# Patient Record
Sex: FEMALE | Race: WHITE | NOT HISPANIC OR LATINO | Employment: STUDENT | URBAN - METROPOLITAN AREA
[De-identification: names, ages, dates, MRNs, and addresses within clinical notes are randomized per-mention and may not be internally consistent; named-entity substitution may affect disease eponyms.]

---

## 2017-03-31 ENCOUNTER — TRANSCRIBE ORDERS (OUTPATIENT)
Dept: ADMINISTRATIVE | Facility: HOSPITAL | Age: 22
End: 2017-03-31

## 2017-03-31 DIAGNOSIS — R13.10 DYSPHAGIA, UNSPECIFIED TYPE: Primary | ICD-10-CM

## 2017-04-07 ENCOUNTER — HOSPITAL ENCOUNTER (OUTPATIENT)
Dept: ULTRASOUND IMAGING | Facility: HOSPITAL | Age: 22
Discharge: HOME | End: 2017-04-07
Admitting: INTERNAL MEDICINE

## 2017-04-07 DIAGNOSIS — R13.10 DYSPHAGIA, UNSPECIFIED TYPE: ICD-10-CM

## 2017-04-07 PROCEDURE — 76536 US EXAM OF HEAD AND NECK: CPT

## 2018-05-10 ENCOUNTER — TRANSCRIBE ORDERS (OUTPATIENT)
Dept: ADMINISTRATIVE | Facility: HOSPITAL | Age: 23
End: 2018-05-10

## 2018-05-10 DIAGNOSIS — R13.19 OTHER DYSPHAGIA: Primary | ICD-10-CM

## 2018-05-16 ENCOUNTER — APPOINTMENT (OUTPATIENT)
Dept: ULTRASOUND IMAGING | Facility: HOSPITAL | Age: 23
End: 2018-05-16

## 2022-06-30 ENCOUNTER — OFFICE VISIT (OUTPATIENT)
Dept: FAMILY MEDICINE CLINIC | Facility: CLINIC | Age: 27
End: 2022-06-30

## 2022-06-30 VITALS
WEIGHT: 230 LBS | DIASTOLIC BLOOD PRESSURE: 84 MMHG | SYSTOLIC BLOOD PRESSURE: 124 MMHG | BODY MASS INDEX: 45.16 KG/M2 | HEART RATE: 86 BPM | HEIGHT: 60 IN | TEMPERATURE: 98.6 F | OXYGEN SATURATION: 98 %

## 2022-06-30 DIAGNOSIS — L98.9 DISORDER OF SKIN: ICD-10-CM

## 2022-06-30 DIAGNOSIS — R74.8 ELEVATED ALKALINE PHOSPHATASE LEVEL: Primary | ICD-10-CM

## 2022-06-30 PROCEDURE — 99213 OFFICE O/P EST LOW 20 MIN: CPT | Performed by: PHYSICIAN ASSISTANT

## 2022-06-30 RX ORDER — OMEPRAZOLE 20 MG/1
20 CAPSULE, DELAYED RELEASE ORAL DAILY
COMMUNITY
End: 2022-12-19 | Stop reason: DRUGHIGH

## 2022-06-30 RX ORDER — LAMOTRIGINE 100 MG/1
200 TABLET ORAL DAILY
COMMUNITY
End: 2022-08-24 | Stop reason: SDUPTHER

## 2022-06-30 RX ORDER — AMLODIPINE BESYLATE 5 MG/1
5 TABLET ORAL DAILY
COMMUNITY
End: 2023-02-15 | Stop reason: DRUGHIGH

## 2022-06-30 NOTE — PROGRESS NOTES
"Chief Complaint  Rash (Bilateral hands; itchy. Stopped taking Latuda 60 mg, possible reaction.)    Subjective          Fabby Carter presents to [facility]  Patient in today for f/up on recurrent rash to tops of hands over past few months. She thought may be associated with her Latuda as noticed after she increased the daily dosage. She stopped taking the Latuda over a week ago and states her rash has improved and is now pretty much gone. She denies any other skin irritants or new skin products. Denies pain to area- but has been itchy when rash is there. States also had one small spot to base of right great toe but it has gone away as well.  Also while here she needs to recheck her alk phosphatase that showed mild elevation with last labs. Denies any abdominal pain.     Rash  This is a recurrent problem. The affected locations include the right hand and left hand. The rash is characterized by itchiness. It is unknown if there was an exposure to a precipitant. Pertinent negatives include no diarrhea, fatigue, shortness of breath or vomiting.       Objective   Vital Signs:   /84   Pulse 86   Temp 98.6 °F (37 °C)   Ht 152.4 cm (60\")   Wt 104 kg (230 lb)   SpO2 98%   BMI 44.92 kg/m²     Body mass index is 44.92 kg/m².    Review of Systems   Constitutional: Negative for fatigue.   Respiratory: Negative for shortness of breath.    Cardiovascular: Negative for chest pain.   Gastrointestinal: Negative for abdominal pain, diarrhea, nausea and vomiting.   Skin: Positive for rash.       Past History:  Medical History: has a past medical history of Anxiety, Hypertension, Neurotic depression, and Obesity.   Surgical History: has no past surgical history on file.   Family History: family history includes ADD / ADHD in her mother; Alcohol abuse in an other family member; Allergies in her father and mother; Cancer in an other family member; Depression in her mother; Diabetes in her " father and another family member; Mental illness in her father, mother, and another family member; Obesity in her father, mother, and another family member.   Social History: reports that she has never smoked. She has never used smokeless tobacco.      Current Outpatient Medications:   •  amLODIPine (NORVASC) 5 MG tablet, Take 5 mg by mouth Daily., Disp: , Rfl:   •  lamoTRIgine (LaMICtal) 100 MG tablet, Take 200 mg by mouth Daily., Disp: , Rfl:   •  omeprazole (priLOSEC) 20 MG capsule, Take 20 mg by mouth Daily., Disp: , Rfl:   Allergies: Patient has no known allergies.    Physical Exam  Constitutional:       Appearance: Normal appearance.   Cardiovascular:      Rate and Rhythm: Normal rate and regular rhythm.      Heart sounds: Normal heart sounds.   Pulmonary:      Effort: Pulmonary effort is normal.   Skin:     Comments: Normal skin exam today- no rash/redness noted to hands.    Neurological:      Mental Status: She is alert.             Assessment and Plan   Diagnoses and all orders for this visit:    1. Elevated alkaline phosphatase level (Primary)  -     Comprehensive Metabolic Panel; Future  -     Comprehensive Metabolic Panel  Will recheck cmp today.   2. Disorder of skin  Rash has gone away at this time. Picture she has brought in appears as dermatitis or possible dyshidrotic eczema.  Unsure if related to her medication but she has discontinued and will be f/up with her psychiatrist for further med mgt. If returns, can try topical hydrocortisone cream sparingly and aquaphor to help keep moisturized. If not improving would recommend derm eval.           Follow Up   Return if symptoms worsen or fail to improve.  Patient was given instructions and counseling regarding her condition or for health maintenance advice. Please see specific information pulled into the AVS if appropriate.     Leisa Brito PA-C

## 2022-07-01 DIAGNOSIS — E87.5 HYPERKALEMIA: Primary | ICD-10-CM

## 2022-07-01 LAB
ALBUMIN SERPL-MCNC: 5.1 G/DL (ref 3.9–5)
ALBUMIN/GLOB SERPL: 1.9 {RATIO} (ref 1.2–2.2)
ALP SERPL-CCNC: 176 IU/L (ref 44–121)
ALT SERPL-CCNC: 14 IU/L (ref 0–32)
AST SERPL-CCNC: 17 IU/L (ref 0–40)
BILIRUB SERPL-MCNC: 0.3 MG/DL (ref 0–1.2)
BUN SERPL-MCNC: 9 MG/DL (ref 6–20)
BUN/CREAT SERPL: 12 (ref 9–23)
CALCIUM SERPL-MCNC: 10.1 MG/DL (ref 8.7–10.2)
CHLORIDE SERPL-SCNC: 98 MMOL/L (ref 96–106)
CO2 SERPL-SCNC: 25 MMOL/L (ref 20–29)
CREAT SERPL-MCNC: 0.77 MG/DL (ref 0.57–1)
EGFRCR SERPLBLD CKD-EPI 2021: 109 ML/MIN/1.73
GLOBULIN SER CALC-MCNC: 2.7 G/DL (ref 1.5–4.5)
GLUCOSE SERPL-MCNC: 87 MG/DL (ref 65–99)
POTASSIUM SERPL-SCNC: 5.4 MMOL/L (ref 3.5–5.2)
PROT SERPL-MCNC: 7.8 G/DL (ref 6–8.5)
SODIUM SERPL-SCNC: 139 MMOL/L (ref 134–144)

## 2022-07-06 DIAGNOSIS — R74.8 ELEVATED SERUM ALKALINE PHOSPHATASE LEVEL: Primary | ICD-10-CM

## 2022-07-12 ENCOUNTER — LAB (OUTPATIENT)
Dept: FAMILY MEDICINE CLINIC | Facility: CLINIC | Age: 27
End: 2022-07-12

## 2022-07-12 DIAGNOSIS — E87.5 HYPERKALEMIA: ICD-10-CM

## 2022-07-12 PROCEDURE — 36415 COLL VENOUS BLD VENIPUNCTURE: CPT | Performed by: PHYSICIAN ASSISTANT

## 2022-07-13 LAB — POTASSIUM SERPL-SCNC: 4.8 MMOL/L (ref 3.5–5.2)

## 2022-10-01 ENCOUNTER — TELEPHONE (OUTPATIENT)
Dept: FAMILY MEDICINE CLINIC | Facility: CLINIC | Age: 27
End: 2022-10-01

## 2022-10-01 NOTE — TELEPHONE ENCOUNTER
Pt was calling to check on the status of her new GI referral. She states that she sent a message in [facility] [employer] and has not heard anything yet.

## 2022-10-03 DIAGNOSIS — R74.8 ELEVATED ALKALINE PHOSPHATASE LEVEL: Primary | ICD-10-CM

## 2022-10-03 NOTE — TELEPHONE ENCOUNTER
PATIENT INFORMED OF MESSAGE BUT PATIENT STATES SHE ARRIVED LATE TO THE APPOINTMENT WITH JESSICA ARIAS ON 08/24/2022 AND THEY WOULD NOT SEE HER. THE PATIENT REPORTS SHE WOULD LIKE AN ALTERNATE REFERRAL REQUEST TO [facility] at [address] [place] [address] PHONE NUMBER [phone]      PLEASE CALL THE PATIENT AND ADVISE [phone]

## 2022-10-03 NOTE — TELEPHONE ENCOUNTER
HUB TO READ    Left VM to return call. The scheduling team tried to reach out three times regarding referral and appt. Patient was scheduled 08/24/22 with Bella Vieira and was a no show. Please contact [phone] to reschedule that appt. Thanks!

## 2022-12-19 RX ORDER — OMEPRAZOLE 40 MG/1
40 CAPSULE, DELAYED RELEASE ORAL DAILY
Qty: 30 CAPSULE | Refills: 0 | Status: SHIPPED | OUTPATIENT
Start: 2022-12-19

## 2023-01-18 ENCOUNTER — TRANSCRIBE ORDERS (OUTPATIENT)
Dept: ADMINISTRATIVE | Facility: HOSPITAL | Age: 28
End: 2023-01-18
Payer: COMMERCIAL

## 2023-01-18 DIAGNOSIS — R74.8 ABNORMAL ALKALINE PHOSPHATASE TEST: Primary | ICD-10-CM

## 2023-02-09 ENCOUNTER — TELEPHONE (OUTPATIENT)
Dept: FAMILY MEDICINE CLINIC | Facility: CLINIC | Age: 28
End: 2023-02-09
Payer: COMMERCIAL

## 2023-02-13 NOTE — TELEPHONE ENCOUNTER
"LVM for pt to call back   HUB TO READ  \" Pt needs to be seen for refills If interested in weight loss can be seen by Crystal\" but does need an appt.   "
Pt has appt this week.  
She would need an appt to discuss bp and medication. Thanks.   
You can access the FollowMyHealth Patient Portal offered by [facility] by registering at the following website: [email or web address]. By joining [email or web address]’s FollowMyHealth portal, you will also be able to view your health information using other applications (apps) compatible with our system.

## 2023-02-15 ENCOUNTER — OFFICE VISIT (OUTPATIENT)
Dept: FAMILY MEDICINE CLINIC | Facility: CLINIC | Age: 28
End: 2023-02-15
Payer: COMMERCIAL

## 2023-02-15 VITALS
HEART RATE: 103 BPM | WEIGHT: 233 LBS | HEIGHT: 60 IN | BODY MASS INDEX: 45.75 KG/M2 | SYSTOLIC BLOOD PRESSURE: 130 MMHG | DIASTOLIC BLOOD PRESSURE: 80 MMHG | OXYGEN SATURATION: 98 %

## 2023-02-15 DIAGNOSIS — R74.8 ELEVATED ALKALINE PHOSPHATASE LEVEL: ICD-10-CM

## 2023-02-15 DIAGNOSIS — Z13.220 LIPID SCREENING: ICD-10-CM

## 2023-02-15 DIAGNOSIS — I10 ESSENTIAL HYPERTENSION: Primary | ICD-10-CM

## 2023-02-15 DIAGNOSIS — Z13.1 DIABETES MELLITUS SCREENING: ICD-10-CM

## 2023-02-15 PROCEDURE — 99214 OFFICE O/P EST MOD 30 MIN: CPT | Performed by: PHYSICIAN ASSISTANT

## 2023-02-15 PROCEDURE — 36415 COLL VENOUS BLD VENIPUNCTURE: CPT | Performed by: PHYSICIAN ASSISTANT

## 2023-02-15 RX ORDER — AMLODIPINE BESYLATE 10 MG/1
10 TABLET ORAL DAILY
Qty: 90 TABLET | Refills: 1 | Status: SHIPPED | OUTPATIENT
Start: 2023-02-15

## 2023-02-15 NOTE — PROGRESS NOTES
"Chief Complaint  Hypertension (Last week noticed BP was high been doubling dosage of amlodipine and has improved)    Subjective          Fabby Tasia Carter presents to [facility]  History of Present Illness  Patient in today for medication recheck and refill.  She states had noticed last week her blood pressure was staying elevated so she increased the dosage of amlodipine to 10 mg/day.  She states since that time her blood pressure has improved.  She denies any chest pain or shortness of breath.  She is here today to get her labs checked as well.  She did have follow-up with GI and had a normal EGD.  She has had mild elevation to her alk phosphatase levels and they had recommended a liver ultrasound but too expensive at this time.  They did do an isoenzyme test that was normal for bone liver and intestinal isoenzymes.  She states is trying to eat healthier and work on exercise to try and help lose weight. She states has been diagnosed with sleep apnea, but is still waiting to see if can get cpap covered through insurance.   Hypertension  This is a chronic problem. Pertinent negatives include no chest pain, peripheral edema or shortness of breath. Current antihypertension treatment includes calcium channel blockers.       Objective   Vital Signs:   /80 (BP Location: Left arm, Patient Position: Sitting, Cuff Size: Large Adult)   Pulse 103   Ht 152.4 cm (60\")   Wt 106 kg (233 lb)   SpO2 98%   BMI 45.50 kg/m²     Body mass index is 45.5 kg/m².    Review of Systems   Respiratory: Negative for shortness of breath.    Cardiovascular: Negative for chest pain.       Past History:  Medical History: has a past medical history of Anxiety, Hypertension, Neurotic depression, and Obesity.   Surgical History: has no past surgical history on file.   Family History: family history includes ADD / ADHD in her mother; Alcohol abuse in an other family member; Allergies in her father and " mother; Cancer in an other family member; Depression in her mother; Diabetes in her father and another family member; Mental illness in her father, mother, and another family member; Obesity in her father, mother, and another family member.   Social History: reports that she has never smoked. She has never used smokeless tobacco. She reports that she does not currently use alcohol. She reports that she does not use drugs.      Current Outpatient Medications:   •  omeprazole (priLOSEC) 40 MG capsule, Take 1 capsule by mouth Daily., Disp: 30 capsule, Rfl: 0  •  amLODIPine (NORVASC) 10 MG tablet, Take 1 tablet by mouth Daily., Disp: 90 tablet, Rfl: 1  Allergies: Patient has no known allergies.    Physical Exam        Assessment and Plan   Diagnoses and all orders for this visit:    1. Essential hypertension (Primary)  -     CBC & Differential; Future  -     Comprehensive Metabolic Panel; Future  -     TSH; Future  Will continue amlodipine 10 mg and continue to monitor bp. Encouraged healthy diet and exercise.  RTC prior to recheck as needed.   2. Lipid screening  -     Lipid Panel; Future  Will check lipid panel today.   3. Diabetes mellitus screening  -     Hemoglobin A1c; Future  4. Elevated alkaling phosphatase level  Patient has declined liver ultrasound at this time due to cost and prefers to monitor with labs and then will f/up with GI as directed.   Other orders  -     amLODIPine (NORVASC) 10 MG tablet; Take 1 tablet by mouth Daily.  Dispense: 90 tablet; Refill: 1            Follow Up   No follow-ups on file.  Patient was given instructions and counseling regarding her condition or for health maintenance advice. Please see specific information pulled into the AVS if appropriate.     Leisa Brito PA-C

## 2023-02-16 ENCOUNTER — TELEPHONE (OUTPATIENT)
Dept: FAMILY MEDICINE CLINIC | Facility: CLINIC | Age: 28
End: 2023-02-16

## 2023-02-16 DIAGNOSIS — D72.829 LEUKOCYTOSIS, UNSPECIFIED TYPE: Primary | ICD-10-CM

## 2023-02-16 LAB
ALBUMIN SERPL-MCNC: 4.7 G/DL (ref 3.9–5)
ALBUMIN/GLOB SERPL: 1.8 {RATIO} (ref 1.2–2.2)
ALP SERPL-CCNC: 158 IU/L (ref 44–121)
ALT SERPL-CCNC: 29 IU/L (ref 0–32)
AST SERPL-CCNC: 23 IU/L (ref 0–40)
BASOPHILS # BLD AUTO: 0.2 X10E3/UL (ref 0–0.2)
BASOPHILS NFR BLD AUTO: 1 %
BILIRUB SERPL-MCNC: 0.4 MG/DL (ref 0–1.2)
BUN SERPL-MCNC: 13 MG/DL (ref 6–20)
BUN/CREAT SERPL: 18 (ref 9–23)
CALCIUM SERPL-MCNC: 9.7 MG/DL (ref 8.7–10.2)
CHLORIDE SERPL-SCNC: 102 MMOL/L (ref 96–106)
CHOLEST SERPL-MCNC: 155 MG/DL (ref 100–199)
CO2 SERPL-SCNC: 23 MMOL/L (ref 20–29)
CREAT SERPL-MCNC: 0.72 MG/DL (ref 0.57–1)
EGFRCR SERPLBLD CKD-EPI 2021: 117 ML/MIN/1.73
EOSINOPHIL # BLD AUTO: 0.6 X10E3/UL (ref 0–0.4)
EOSINOPHIL NFR BLD AUTO: 4 %
ERYTHROCYTE [DISTWIDTH] IN BLOOD BY AUTOMATED COUNT: 12.9 % (ref 11.7–15.4)
GLOBULIN SER CALC-MCNC: 2.6 G/DL (ref 1.5–4.5)
GLUCOSE SERPL-MCNC: 105 MG/DL (ref 70–99)
HBA1C MFR BLD: 5.6 % (ref 4.8–5.6)
HCT VFR BLD AUTO: 42.4 % (ref 34–46.6)
HDLC SERPL-MCNC: 35 MG/DL
HGB BLD-MCNC: 14.4 G/DL (ref 11.1–15.9)
IMM GRANULOCYTES # BLD AUTO: 0 X10E3/UL (ref 0–0.1)
IMM GRANULOCYTES NFR BLD AUTO: 0 %
LDLC SERPL CALC-MCNC: 103 MG/DL (ref 0–99)
LYMPHOCYTES # BLD AUTO: 3.3 X10E3/UL (ref 0.7–3.1)
LYMPHOCYTES NFR BLD AUTO: 21 %
MCH RBC QN AUTO: 29.4 PG (ref 26.6–33)
MCHC RBC AUTO-ENTMCNC: 34 G/DL (ref 31.5–35.7)
MCV RBC AUTO: 87 FL (ref 79–97)
MONOCYTES # BLD AUTO: 1.1 X10E3/UL (ref 0.1–0.9)
MONOCYTES NFR BLD AUTO: 7 %
NEUTROPHILS # BLD AUTO: 10.4 X10E3/UL (ref 1.4–7)
NEUTROPHILS NFR BLD AUTO: 67 %
PLATELET # BLD AUTO: 397 X10E3/UL (ref 150–450)
POTASSIUM SERPL-SCNC: 4.6 MMOL/L (ref 3.5–5.2)
PROT SERPL-MCNC: 7.3 G/DL (ref 6–8.5)
RBC # BLD AUTO: 4.9 X10E6/UL (ref 3.77–5.28)
SODIUM SERPL-SCNC: 139 MMOL/L (ref 134–144)
TRIGL SERPL-MCNC: 92 MG/DL (ref 0–149)
TSH SERPL DL<=0.005 MIU/L-ACNC: 1.82 UIU/ML (ref 0.45–4.5)
VLDLC SERPL CALC-MCNC: 17 MG/DL (ref 5–40)
WBC # BLD AUTO: 15.6 X10E3/UL (ref 3.4–10.8)

## 2023-02-16 NOTE — TELEPHONE ENCOUNTER
Contacted pt and she states she is feeling fine and would like to know if you can put in an order to repeat  wbc

## 2023-02-16 NOTE — TELEPHONE ENCOUNTER
Recommendation is to be seen for evaluation, however if she is not wanting to do so, the minimal would be to check cbc in one week. I put in order.  If any acute symptoms, needs to be seen. Thanks.

## 2023-02-16 NOTE — TELEPHONE ENCOUNTER
"HUB TO READ  \"Please let know her labs showed her wbc elevated at 15.6, nml < 10.8-- this can be up from acute infection- she didn't mention anything yesterday about it but would recommend to come in to r/o any acute infection issues-- if acutely ill this evening would recommend urgent care or ER evaluation; alk phosphatase at 158; TSH and 3 month sugar average in nml range; thanks\"  "

## 2023-02-16 NOTE — TELEPHONE ENCOUNTER
"Maria Teresa Hopper MA          1:05 PM  Note    HUB TO READ  \"Please let know her labs showed her wbc elevated at 15.6, nml < 10.8-- this can be up from acute infection- she didn't mention anything yesterday about it but would recommend to come in to r/o any acute infection issues-- if acutely ill this evening would recommend urgent care or ER evaluation; alk phosphatase at 158; TSH and 3 month sugar average in nml range; thanks\"        PATIENT INFORMED OF HUB TO READ, UNDERSTOOD,  IS REPEATING THE WBC COUNT AN OPTION, IF SO NEEDS AN ORDER PLEASE ADVISE NOT FEELING ILL AT ALL.  HAS A HX OF ELEVATED WBC COUNT, PLEASE ADVISE  "

## 2023-02-17 NOTE — TELEPHONE ENCOUNTER
Patient called back read message from Jackelin she said she is not having symptoms scheduled CBC recheck

## 2023-02-23 ENCOUNTER — LAB (OUTPATIENT)
Dept: FAMILY MEDICINE CLINIC | Facility: CLINIC | Age: 28
End: 2023-02-23
Payer: COMMERCIAL

## 2023-02-23 DIAGNOSIS — D72.829 LEUKOCYTOSIS, UNSPECIFIED TYPE: ICD-10-CM

## 2023-02-23 PROCEDURE — 36415 COLL VENOUS BLD VENIPUNCTURE: CPT | Performed by: PHYSICIAN ASSISTANT

## 2023-02-24 LAB
BASOPHILS # BLD AUTO: 0.1 X10E3/UL (ref 0–0.2)
BASOPHILS NFR BLD AUTO: 1 %
EOSINOPHIL # BLD AUTO: 0.6 X10E3/UL (ref 0–0.4)
EOSINOPHIL NFR BLD AUTO: 5 %
ERYTHROCYTE [DISTWIDTH] IN BLOOD BY AUTOMATED COUNT: 13 % (ref 11.7–15.4)
HCT VFR BLD AUTO: 41 % (ref 34–46.6)
HGB BLD-MCNC: 13.9 G/DL (ref 11.1–15.9)
IMM GRANULOCYTES # BLD AUTO: 0 X10E3/UL (ref 0–0.1)
IMM GRANULOCYTES NFR BLD AUTO: 0 %
LYMPHOCYTES # BLD AUTO: 2.9 X10E3/UL (ref 0.7–3.1)
LYMPHOCYTES NFR BLD AUTO: 24 %
MCH RBC QN AUTO: 29.9 PG (ref 26.6–33)
MCHC RBC AUTO-ENTMCNC: 33.9 G/DL (ref 31.5–35.7)
MCV RBC AUTO: 88 FL (ref 79–97)
MONOCYTES # BLD AUTO: 0.9 X10E3/UL (ref 0.1–0.9)
MONOCYTES NFR BLD AUTO: 7 %
NEUTROPHILS # BLD AUTO: 7.7 X10E3/UL (ref 1.4–7)
NEUTROPHILS NFR BLD AUTO: 63 %
PLATELET # BLD AUTO: 392 X10E3/UL (ref 150–450)
RBC # BLD AUTO: 4.65 X10E6/UL (ref 3.77–5.28)
WBC # BLD AUTO: 12.2 X10E3/UL (ref 3.4–10.8)

## 2023-03-01 ENCOUNTER — OFFICE VISIT (OUTPATIENT)
Dept: FAMILY MEDICINE CLINIC | Facility: CLINIC | Age: 28
End: 2023-03-01
Payer: COMMERCIAL

## 2023-03-01 VITALS
HEIGHT: 60 IN | BODY MASS INDEX: 46.13 KG/M2 | OXYGEN SATURATION: 99 % | HEART RATE: 105 BPM | WEIGHT: 235 LBS | DIASTOLIC BLOOD PRESSURE: 78 MMHG | TEMPERATURE: 98.1 F | SYSTOLIC BLOOD PRESSURE: 140 MMHG

## 2023-03-01 DIAGNOSIS — M54.50 CHRONIC BILATERAL LOW BACK PAIN WITHOUT SCIATICA: ICD-10-CM

## 2023-03-01 DIAGNOSIS — D72.829 LEUKOCYTOSIS, UNSPECIFIED TYPE: ICD-10-CM

## 2023-03-01 DIAGNOSIS — M25.50 MULTIPLE JOINT PAIN: ICD-10-CM

## 2023-03-01 DIAGNOSIS — G89.29 CHRONIC BILATERAL LOW BACK PAIN WITHOUT SCIATICA: ICD-10-CM

## 2023-03-01 DIAGNOSIS — R31.9 HEMATURIA, UNSPECIFIED TYPE: Primary | ICD-10-CM

## 2023-03-01 LAB
BILIRUB BLD-MCNC: NEGATIVE MG/DL
CLARITY, POC: CLEAR
COLOR UR: YELLOW
GLUCOSE UR STRIP-MCNC: NEGATIVE MG/DL
KETONES UR QL: NEGATIVE
LEUKOCYTE EST, POC: NEGATIVE
NITRITE UR-MCNC: NEGATIVE MG/ML
PH UR: 6.5 [PH] (ref 5–8)
PROT UR STRIP-MCNC: NEGATIVE MG/DL
RBC # UR STRIP: ABNORMAL /UL
SP GR UR: 1.03 (ref 1–1.03)
UROBILINOGEN UR QL: NORMAL

## 2023-03-01 PROCEDURE — 81002 URINALYSIS NONAUTO W/O SCOPE: CPT | Performed by: PHYSICIAN ASSISTANT

## 2023-03-01 PROCEDURE — 99214 OFFICE O/P EST MOD 30 MIN: CPT | Performed by: PHYSICIAN ASSISTANT

## 2023-03-01 NOTE — PROGRESS NOTES
"Chief Complaint  follow up (Follow up on labs WBC high )    Subjective          Fabby Carter presents to [facility]  History of Present Illness  Patient today for further evaluation on mild elevation to her white blood cell count.  Initially had this drawn 2 weeks ago  and it was at 15.6 and then had redrawn last week and was down to 12.2.  He denies any symptoms of infection.  She denies any changes to skin.  She denies any fever.  Denies any nausea, vomiting, or diarrhea.  She denies any nasal congestion or sore throat or cough.  Denies any dysuria or increase of frequency of urination.  She states years ago her white blood cell count was elevated and no source was found and it went back down on its own. She states does have chronic multiple joint pains and would like to get a few additional labs for that checked with her next set of labs. She also has had chronic lower back pain for past 10-15 yrs. She states developed early and feels large breast size has caused a lot of her chronic back pain- currently wearing size 42H bra. If she stands for period of time, her lower back really starts to bother her. Denies any numbness into legs.       Objective   Vital Signs:   /78 (BP Location: Left arm, Patient Position: Sitting, Cuff Size: Large Adult)   Pulse 105   Ht 152.4 cm (60\")   Wt 107 kg (235 lb)   SpO2 99%   BMI 45.90 kg/m²     Body mass index is 45.9 kg/m².    Review of Systems   Constitutional: Negative for fatigue and fever.   HENT: Negative for congestion, ear pain and sore throat.    Respiratory: Negative for cough and shortness of breath.    Cardiovascular: Negative for chest pain.   Gastrointestinal: Negative for abdominal pain, diarrhea, nausea and vomiting.   Genitourinary: Negative for dysuria, frequency and hematuria.   Musculoskeletal: Positive for arthralgias and back pain.   Neurological: Negative for dizziness and headache.       Past " History:  Medical History: has a past medical history of Anxiety, Hypertension, Neurotic depression, and Obesity.   Surgical History: has no past surgical history on file.   Family History: family history includes ADD / ADHD in her mother; Alcohol abuse in an other family member; Allergies in her father and mother; Cancer in an other family member; Depression in her mother; Diabetes in her father and another family member; Mental illness in her father, mother, and another family member; Obesity in her father, mother, and another family member.   Social History: reports that she has never smoked. She has never used smokeless tobacco. She reports that she does not currently use alcohol. She reports that she does not use drugs.      Current Outpatient Medications:   •  amLODIPine (NORVASC) 10 MG tablet, Take 1 tablet by mouth Daily., Disp: 90 tablet, Rfl: 1  •  omeprazole (priLOSEC) 40 MG capsule, Take 1 capsule by mouth Daily., Disp: 30 capsule, Rfl: 0  Allergies: Patient has no known allergies.    Physical Exam  Constitutional:       Appearance: Normal appearance.   HENT:      Right Ear: Tympanic membrane normal.      Left Ear: Tympanic membrane normal.      Mouth/Throat:      Pharynx: Oropharynx is clear.   Eyes:      Conjunctiva/sclera: Conjunctivae normal.      Pupils: Pupils are equal, round, and reactive to light.   Cardiovascular:      Rate and Rhythm: Normal rate and regular rhythm.      Heart sounds: Normal heart sounds.   Pulmonary:      Effort: Pulmonary effort is normal.      Breath sounds: Normal breath sounds.   Abdominal:      Palpations: Abdomen is soft.      Tenderness: There is no abdominal tenderness.   Musculoskeletal:      Thoracic back: Normal.      Lumbar back: Tenderness present. Normal range of motion.      Comments: Walks with nml gait.    Skin:     Findings: No rash.   Neurological:      Mental Status: She is alert and oriented to person, place, and time.   Psychiatric:         Mood and  Affect: Mood normal.         Behavior: Behavior normal.             Assessment and Plan   Diagnoses and all orders for this visit:    1. Hematuria, unspecified type (Primary)  -     Urine Culture - Urine, Urine, Clean Catch; Future  -     POC Urinalysis Dipstick  -     Urine Culture - Urine, Urine, Clean Catch  Will send urine culture- her menstrual cycle ended a few days ago so could be related to that.   2. Leukocytosis, unspecified type  -     CBC & Differential; Future  Patient denies any symptoms of infection. Will recheck cbc in one week and if stays elevated will get in with hematology if needed.   3. Multiple joint pain  -     Sedimentation Rate; Future  -     ALICIA; Future  -     Rheumatoid Factor; Future  Will check ALICIA, RF and ESR with labs.   4. Chronic bilateral low back pain without sciatica  With chronic symptoms, discussed we can check xray of lower back and could try some physical therapy-- she states will consider and let us know if would like to proceed with that.           Follow Up   No follow-ups on file.  Patient was given instructions and counseling regarding her condition or for health maintenance advice. Please see specific information pulled into the AVS if appropriate.     Leisa Brito PA-C

## 2023-03-03 LAB
BACTERIA UR CULT: NORMAL
BACTERIA UR CULT: NORMAL

## 2023-03-06 DIAGNOSIS — R31.9 HEMATURIA, UNSPECIFIED TYPE: Primary | ICD-10-CM

## 2023-03-10 ENCOUNTER — LAB (OUTPATIENT)
Dept: FAMILY MEDICINE CLINIC | Facility: CLINIC | Age: 28
End: 2023-03-10
Payer: COMMERCIAL

## 2023-03-10 DIAGNOSIS — D72.829 LEUKOCYTOSIS, UNSPECIFIED TYPE: ICD-10-CM

## 2023-03-10 DIAGNOSIS — M25.50 MULTIPLE JOINT PAIN: ICD-10-CM

## 2023-03-10 PROCEDURE — 36415 COLL VENOUS BLD VENIPUNCTURE: CPT | Performed by: PHYSICIAN ASSISTANT

## 2023-03-11 LAB
ANA SER QL: NEGATIVE
BASOPHILS # BLD AUTO: 0.1 X10E3/UL (ref 0–0.2)
BASOPHILS NFR BLD AUTO: 1 %
EOSINOPHIL # BLD AUTO: 0.4 X10E3/UL (ref 0–0.4)
EOSINOPHIL NFR BLD AUTO: 3 %
ERYTHROCYTE [DISTWIDTH] IN BLOOD BY AUTOMATED COUNT: 12.9 % (ref 11.7–15.4)
ERYTHROCYTE [SEDIMENTATION RATE] IN BLOOD BY WESTERGREN METHOD: 6 MM/HR (ref 0–32)
HCT VFR BLD AUTO: 41.3 % (ref 34–46.6)
HGB BLD-MCNC: 14.2 G/DL (ref 11.1–15.9)
IMM GRANULOCYTES # BLD AUTO: 0 X10E3/UL (ref 0–0.1)
IMM GRANULOCYTES NFR BLD AUTO: 0 %
LYMPHOCYTES # BLD AUTO: 3 X10E3/UL (ref 0.7–3.1)
LYMPHOCYTES NFR BLD AUTO: 24 %
MCH RBC QN AUTO: 29.9 PG (ref 26.6–33)
MCHC RBC AUTO-ENTMCNC: 34.4 G/DL (ref 31.5–35.7)
MCV RBC AUTO: 87 FL (ref 79–97)
MONOCYTES # BLD AUTO: 0.8 X10E3/UL (ref 0.1–0.9)
MONOCYTES NFR BLD AUTO: 6 %
NEUTROPHILS # BLD AUTO: 8 X10E3/UL (ref 1.4–7)
NEUTROPHILS NFR BLD AUTO: 66 %
PLATELET # BLD AUTO: 403 X10E3/UL (ref 150–450)
RBC # BLD AUTO: 4.75 X10E6/UL (ref 3.77–5.28)
RHEUMATOID FACT SERPL-ACNC: <10 IU/ML
WBC # BLD AUTO: 12.3 X10E3/UL (ref 3.4–10.8)

## 2023-03-13 DIAGNOSIS — D72.829 LEUKOCYTOSIS, UNSPECIFIED TYPE: Primary | ICD-10-CM

## 2023-03-30 ENCOUNTER — TELEPHONE (OUTPATIENT)
Dept: FAMILY MEDICINE CLINIC | Facility: CLINIC | Age: 28
End: 2023-03-30
Payer: COMMERCIAL

## 2023-04-05 ENCOUNTER — OFFICE VISIT (OUTPATIENT)
Dept: FAMILY MEDICINE CLINIC | Facility: CLINIC | Age: 28
End: 2023-04-05
Payer: COMMERCIAL

## 2023-04-05 VITALS
WEIGHT: 230 LBS | HEIGHT: 60 IN | HEART RATE: 85 BPM | OXYGEN SATURATION: 98 % | BODY MASS INDEX: 45.16 KG/M2 | DIASTOLIC BLOOD PRESSURE: 80 MMHG | SYSTOLIC BLOOD PRESSURE: 134 MMHG

## 2023-04-05 DIAGNOSIS — R00.2 PALPITATIONS: Primary | ICD-10-CM

## 2023-04-05 DIAGNOSIS — R07.9 CHEST PAIN, UNSPECIFIED TYPE: ICD-10-CM

## 2023-04-05 PROCEDURE — 99213 OFFICE O/P EST LOW 20 MIN: CPT | Performed by: PHYSICIAN ASSISTANT

## 2023-04-05 PROCEDURE — 93000 ELECTROCARDIOGRAM COMPLETE: CPT | Performed by: PHYSICIAN ASSISTANT

## 2023-04-05 NOTE — PROGRESS NOTES
"Chief Complaint  Chest Pain (chest pain off and on for a year and heart fluttering for a month )    Subjective          Fabby Tasia Carter presents to [facility]  History of Present Illness  Patient in today for evaluation on intermittent chest pain for past year and heart fluttering sensation for past month. States occurs at rest and with exertion. Pulse rate has been >120 on occasion. Is utd on labs. Describes chest pain as pressure/squeezing sensation. States tends to get headaches after these episodes.   Chest Pain   This is a recurrent problem. Associated symptoms include palpitations. Pertinent negatives include no abdominal pain, back pain, dizziness, fever, nausea, shortness of breath or vomiting.       Objective   Vital Signs:   /80 (BP Location: Left arm, Patient Position: Sitting, Cuff Size: Large Adult)   Pulse 85   Ht 152.4 cm (60\")   Wt 104 kg (230 lb)   SpO2 98%   BMI 44.92 kg/m²     Body mass index is 44.92 kg/m².    Review of Systems   Constitutional: Negative for fever.   HENT: Negative for congestion and sore throat.    Respiratory: Negative for shortness of breath.    Cardiovascular: Positive for chest pain and palpitations.   Gastrointestinal: Negative for abdominal pain, diarrhea, nausea and vomiting.   Musculoskeletal: Negative for back pain.   Neurological: Positive for headache. Negative for dizziness.   Psychiatric/Behavioral: Negative for depressed mood. The patient is not nervous/anxious.        Past History:  Medical History: has a past medical history of Anxiety, Hypertension, Neurotic depression, and Obesity.   Surgical History: has no past surgical history on file.   Family History: family history includes ADD / ADHD in her mother; Alcohol abuse in an other family member; Allergies in her father and mother; Cancer in an other family member; Depression in her mother; Diabetes in her father and another family member; Mental illness in her " father, mother, and another family member; Obesity in her father, mother, and another family member.   Social History: reports that she has never smoked. She has never used smokeless tobacco. She reports that she does not currently use alcohol. She reports that she does not use drugs.      Current Outpatient Medications:   •  amLODIPine (NORVASC) 10 MG tablet, Take 1 tablet by mouth Daily., Disp: 90 tablet, Rfl: 1  •  omeprazole (priLOSEC) 40 MG capsule, Take 1 capsule by mouth Daily., Disp: 30 capsule, Rfl: 0  Allergies: Patient has no known allergies.    Physical Exam  Constitutional:       Appearance: Normal appearance.   HENT:      Right Ear: Tympanic membrane normal.      Left Ear: Tympanic membrane normal.      Mouth/Throat:      Pharynx: Oropharynx is clear.   Eyes:      Conjunctiva/sclera: Conjunctivae normal.      Pupils: Pupils are equal, round, and reactive to light.   Cardiovascular:      Rate and Rhythm: Normal rate and regular rhythm.      Heart sounds: Normal heart sounds.   Pulmonary:      Effort: Pulmonary effort is normal.      Breath sounds: Normal breath sounds.   Abdominal:      Palpations: Abdomen is soft.      Tenderness: There is no abdominal tenderness.   Neurological:      Mental Status: She is oriented to person, place, and time.   Psychiatric:         Mood and Affect: Mood normal.         Behavior: Behavior normal.        ECG 12 Lead    Date/Time: 4/5/2023 7:27 PM  Performed by: Leisa Brito PA-C  Authorized by: Leisa Brito PA-C   Previous ECG: no previous ECG available  Rhythm: sinus rhythm  BPM: 75    Clinical impression: normal ECG               Assessment and Plan   Diagnoses and all orders for this visit:    1. Palpitations (Primary)  EKG today was normal. .Has had recent labs that showed normal TSH and not anemic. With persistent symptoms, will set up with cardiology for consult. To ER prior for any acute /worsening symptoms if needed.   2. Chest pain, unspecified  type    Other orders  -     ECG 12 Lead            Follow Up   No follow-ups on file.  Patient was given instructions and counseling regarding her condition or for health maintenance advice. Please see specific information pulled into the AVS if appropriate.     Leisa Brito PA-C

## 2023-04-24 ENCOUNTER — TELEPHONE (OUTPATIENT)
Dept: ONCOLOGY | Facility: CLINIC | Age: 28
End: 2023-04-24

## 2023-04-24 NOTE — TELEPHONE ENCOUNTER
Caller: Fabby Carter    Relationship to patient: Self    Best call back number:[phone]    Chief complaint: DOES NOT HAVE THE CO PAY    Type of visit: NEW PT APPT    Requested date: CALL TO R/S    If rescheduling, when is the original appointment: 4/25/2023

## 2023-04-25 PROBLEM — D72.829 LEUCOCYTOSIS: Status: ACTIVE | Noted: 2023-04-25

## 2023-05-10 ENCOUNTER — OFFICE VISIT (OUTPATIENT)
Dept: FAMILY MEDICINE CLINIC | Facility: CLINIC | Age: 28
End: 2023-05-10
Payer: COMMERCIAL

## 2023-05-10 VITALS
BODY MASS INDEX: 45.75 KG/M2 | DIASTOLIC BLOOD PRESSURE: 80 MMHG | SYSTOLIC BLOOD PRESSURE: 124 MMHG | HEART RATE: 82 BPM | HEIGHT: 60 IN | WEIGHT: 233 LBS | OXYGEN SATURATION: 98 %

## 2023-05-10 DIAGNOSIS — R05.1 ACUTE COUGH: Primary | ICD-10-CM

## 2023-05-10 PROCEDURE — 99213 OFFICE O/P EST LOW 20 MIN: CPT | Performed by: PHYSICIAN ASSISTANT

## 2023-05-10 NOTE — PROGRESS NOTES
"Chief Complaint  Cough (Pt comes in today for cough x 1-2 months.)    Subjective          Fabby aTsia Carter presents to [facility]  History of Present Illness  Patient in today for evaluation persistent hacky cough for past several months. She denies any fever or nasal congestion associated. She states her reflux symptoms seem well controlled on the omeprazole daily. She tried daily allegra but did not seem to help. Denies wheezing. Denies vomiting or diarrhea.   Cough  This is a recurrent problem. The current episode started more than 1 month ago. The cough is non-productive. Pertinent negatives include no chest pain, ear pain, fever, headaches, heartburn, nasal congestion, postnasal drip, shortness of breath or wheezing.       Objective   Vital Signs:   /80 (BP Location: Left arm, Patient Position: Sitting, Cuff Size: Large Adult)   Pulse 82   Ht 152.4 cm (60\")   Wt 106 kg (233 lb)   SpO2 98%   BMI 45.50 kg/m²     Body mass index is 45.5 kg/m².    Review of Systems   Constitutional: Negative for fever.   HENT: Negative for ear pain and postnasal drip.    Respiratory: Positive for cough. Negative for shortness of breath and wheezing.    Cardiovascular: Negative for chest pain, palpitations and leg swelling.   Gastrointestinal: Negative for abdominal pain, diarrhea, nausea and vomiting.   Neurological: Negative for dizziness and headache.       Past History:  Medical History: has a past medical history of Anxiety, Hypertension, Neurotic depression, and Obesity.   Surgical History: has no past surgical history on file.   Family History: family history includes ADD / ADHD in her mother; Alcohol abuse in an other family member; Allergies in her father and mother; Cancer in an other family member; Depression in her mother; Diabetes in her father and another family member; Mental illness in her father, mother, and another family member; Obesity in her father, mother, and " another family member.   Social History: reports that she has never smoked. She has never used smokeless tobacco. She reports that she does not currently use alcohol. She reports that she does not use drugs.      Current Outpatient Medications:   •  amLODIPine (NORVASC) 10 MG tablet, Take 1 tablet by mouth Daily., Disp: 90 tablet, Rfl: 1  •  omeprazole (priLOSEC) 40 MG capsule, Take 1 capsule by mouth Daily., Disp: 30 capsule, Rfl: 0  Allergies: Patient has no known allergies.    Physical Exam  Constitutional:       Appearance: Normal appearance.   HENT:      Right Ear: Tympanic membrane normal.      Left Ear: Tympanic membrane normal.      Mouth/Throat:      Pharynx: Oropharynx is clear.   Eyes:      Conjunctiva/sclera: Conjunctivae normal.      Pupils: Pupils are equal, round, and reactive to light.   Cardiovascular:      Rate and Rhythm: Normal rate and regular rhythm.      Heart sounds: Normal heart sounds.   Pulmonary:      Effort: Pulmonary effort is normal.      Breath sounds: Normal breath sounds.   Abdominal:      Palpations: Abdomen is soft.      Tenderness: There is no abdominal tenderness.   Musculoskeletal:      Right lower leg: No edema.      Left lower leg: No edema.   Neurological:      Mental Status: She is oriented to person, place, and time.   Psychiatric:         Mood and Affect: Mood normal.         Behavior: Behavior normal.             Assessment and Plan   Diagnoses and all orders for this visit:    1. Acute cough (Primary)  -     XR Chest PA & Lateral (In Office)  Lungs sound clear today. Will check chest xray with persistent cough and to consider pulmonology referral . Continue treatment for reflux and allergies. RTC prn.           Follow Up   No follow-ups on file.  Patient was given instructions and counseling regarding her condition or for health maintenance advice. Please see specific information pulled into the AVS if appropriate.     Leisa Brito PA-C

## 2023-05-23 ENCOUNTER — CONSULT (OUTPATIENT)
Dept: ONCOLOGY | Facility: CLINIC | Age: 28
End: 2023-05-23
Payer: COMMERCIAL

## 2023-05-23 VITALS
OXYGEN SATURATION: 98 % | HEIGHT: 60 IN | BODY MASS INDEX: 46.53 KG/M2 | RESPIRATION RATE: 18 BRPM | WEIGHT: 237 LBS | SYSTOLIC BLOOD PRESSURE: 140 MMHG | DIASTOLIC BLOOD PRESSURE: 85 MMHG | HEART RATE: 87 BPM | TEMPERATURE: 98.6 F

## 2023-05-23 DIAGNOSIS — D72.828 OTHER ELEVATED WHITE BLOOD CELL (WBC) COUNT: Primary | ICD-10-CM

## 2023-05-23 PROCEDURE — 99204 OFFICE O/P NEW MOD 45 MIN: CPT | Performed by: INTERNAL MEDICINE

## 2023-05-23 RX ORDER — OMEPRAZOLE 40 MG/1
40 CAPSULE, DELAYED RELEASE ORAL DAILY
Qty: 90 CAPSULE | Refills: 1 | Status: SHIPPED | OUTPATIENT
Start: 2023-05-23

## 2023-05-23 NOTE — PROGRESS NOTES
CHIEF COMPLAINT: Leukocytosis    REASON FOR REFERRAL: Leukocytosis      RECORDS OBTAINED  Records of the patients history including those obtained from primary care and Dr. Meredith were reviewed and summarized in detail.    Oncology/Hematology History Overview Note   1.  Leukocytosis  2.  Arthritis with chronic low back pain  3.  Gastritis on EGD January 2023  4.  Hypertension  5.  Obstructive sleep apnea potentially with obesity with evidence of reflux and laryngeal edema on endoscopy with ENT   6.  Anxiety depression on Lamictal with erythema back of hands on higher doses of Lamictal    Hematology history timeline:  -3/3/2022 CBC unremarkable with white count 10,200 differential.  -3/25/2022 potential obstructive sleep apnea with obesity with evidence of reflux and laryngeal edema on endoscopy with ENT this day.  -6/30/2022 alkaline phosphatase 176  -12/15/2022 alkaline phosphatase 143 with normal percentages of intestinal, bone, and liver isoenzymes.  GGT normal.  Celiac panel negative.  Calprotectin normal.  Qualitative fecal fat normal.  Pancreatic elastase normal indicative of known pancreatic insufficiency.  -1/4/2023 EGD Han Meredith showed mild gastric erythema otherwise normal EGD placed on omeprazole twice a day with plan for right upper quadrant ultrasound to evaluate elevated alkaline phosphatase.  Pathology consistent with chronic gastritis with esophageal biopsy mild basal cell hyperplasia.  -1/13/2023 abdominal ultrasound patient canceled.  -2/15/2023 white count 15,600 with otherwise unremarkable CBC.  Alkaline phosphatase 158 glucose 105 otherwise unremarkable CBC.  -2/23/2023 white count 12,200 otherwise unremarkable CBC  -3/10/2023 white count 12,300 otherwise unremarkable CBC and differential.  Rheumatoid factor and ALICIA negative.  Sedimentation rate 6.    -5/23/2023 [facility] hematology consult: Patient has had chronic dyspeptic symptoms with evidence of laryngeal edema and some gastritis.  The  EGD that was performed by Dr. Meredith earlier this year was performed after she had been on 9 months of omeprazole after her ENT exam March 2022 showed laryngeal edema and hence some of the dyspeptic symptoms may have been mitigated.  However, due to financial constraints she has not been able to do the abdominal ultrasound ordered by Dr. Meredith nor has she gotten refills on her omeprazole so she has been only taking it about every other day for the past month.  She will get with Leisa Brito for refill on her omeprazole or at the very minimum will use over-the-counter Nexium and I explained about the reality of rebound hyperacidity on cessation of proton pump inhibition.  If her white count today is actually higher than it was in March that would suggest this.  I will check her peripheral smear but my suspicions for a chronic leukemia or malignant process especially given that she recounts having had a mild elevation of white counts periodically back into her early teens makes these more sinister culprits very unlikely particularly at her young age and with no lymphadenopathy on exam and no hepatosplenomegaly on exam.  I will check a blood count and peripheral smear with pathology to review and a blood count again in about 2 months on routine proton pump inhibition.  The fact that her sedimentation rate is normal does not rule out inflammation given that her white count elevation is minuscule and is likely just an indicator of low-level inflammation of which she has many potential culprits.  She has had a lot of stress both physically and fiscally which has made it hard for her to comply with all of her testing orders and with all of her medications due to financial constraints.  With regard to the periodic rare erythema on the dorsum of both hands, she has never seen dermatology but I asked her to get with Leisa Brito for dermatology appointment so that, if she ever has another event such as that, she can  urgently be seen for biopsy during the flare to look for leukocytoclastic vasculitis but there is no serologic evidence nor current clinical evidence of lupus or other autoimmune disorder that I can detect.     Leucocytosis       HISTORY OF PRESENT ILLNESS:  The patient is a 27 y.o.  female, referred for chronic leukocytosis that she says dates back even into her early teens and has waxed and waned over time.  Takes Lamictal for depression anxiety and on higher dose states that the dorsum of her hands turns red.  Has never seen dermatology during 1 of these episodes.  Denies fevers or chills.  She has had symptoms of reflux and signs of this on ENT and EGD examination.  More recently due to financial constraints and lack of refills she has been off the omeprazole and instead of taking it twice a day as prescribed by Dr. Meredith has only been taking it about once every other day for the last month.    REVIEW OF SYSTEMS:  Periodic chest pains with nocturnal reflux symptoms and erythema dorsum of hands periodically.  No ronak synovitis.  No fevers or chills or unexplained weight loss.    Past Medical History:   Diagnosis Date   • Anxiety    • Hypertension    • Neurotic depression    • Obesity      No past surgical history on file.    Current Outpatient Medications on File Prior to Visit   Medication Sig Dispense Refill   • amLODIPine (NORVASC) 10 MG tablet Take 1 tablet by mouth Daily. 90 tablet 1   • omeprazole (priLOSEC) 40 MG capsule Take 1 capsule by mouth Daily. 30 capsule 0     No current facility-administered medications on file prior to visit.       No Known Allergies    Social History     Socioeconomic History   • Marital status:    Tobacco Use   • Smoking status: Never   • Smokeless tobacco: Never   Vaping Use   • Vaping Use: Never used   Substance and Sexual Activity   • Alcohol use: Not Currently   • Drug use: Never   • Sexual activity: Yes     Partners: Female       Family History   Problem Relation  "Age of Onset   • Depression Mother    • ADD / ADHD Mother    • Obesity Mother    • Allergies Mother    • Mental illness Mother    • Diabetes Father    • Allergies Father    • Obesity Father    • Mental illness Father    • Mental illness Other    • Obesity Other    • Alcohol abuse Other    • Cancer Other    • Diabetes Other        PHYSICAL EXAM:  No ronak synovitis or rashes.  No cervical axillary or inguinal lymphadenopathy.  No splenomegaly.  No audible wheezes.  No respiratory distress.    /85   Pulse 87   Temp 98.6 °F (37 °C)   Resp 18   Ht 152.4 cm (60\")   Wt 108 kg (237 lb)   SpO2 98%   BMI 46.29 kg/m²     ECOG score: 0           ECOG: (0) Fully Active - Able to Carry On All Pre-disease Performance Without Restriction    Lab Results   Component Value Date    HGB 14.2 03/10/2023    HCT 41.3 03/10/2023    MCV 87 03/10/2023     03/10/2023    WBC 12.3 (H) 03/10/2023    NEUTROABS 8.0 (H) 03/10/2023    LYMPHSABS 3.0 03/10/2023    MONOSABS 0.8 03/10/2023    EOSABS 0.4 03/10/2023    BASOSABS 0.1 03/10/2023     Lab Results   Component Value Date    GLUCOSE 105 (H) 02/15/2023    BUN 13 02/15/2023    CREATININE 0.72 02/15/2023     02/15/2023    K 4.6 02/15/2023     02/15/2023    CO2 23 02/15/2023    CALCIUM 9.7 02/15/2023    ALBUMIN 4.7 02/15/2023    BILITOT 0.4 02/15/2023    ALKPHOS 158 (H) 02/15/2023    AST 23 02/15/2023    ALT 29 02/15/2023         Assessment & Plan     1.  Leukocytosis  2.  Arthritis with chronic low back pain  3.  Gastritis on EGD January 2023  4.  Hypertension  5.  Obstructive sleep apnea potentially with obesity with evidence of reflux and laryngeal edema on endoscopy with ENT   6.  Anxiety depression on Lamictal with erythema back of hands on higher doses of Lamictal    Hematology history timeline:  -3/3/2022 CBC unremarkable with white count 10,200 differential.  -3/25/2022 potential obstructive sleep apnea with obesity with evidence of reflux and laryngeal edema " on endoscopy with ENT this day.  -6/30/2022 alkaline phosphatase 176  -12/15/2022 alkaline phosphatase 143 with normal percentages of intestinal, bone, and liver isoenzymes.  GGT normal.  Celiac panel negative.  Calprotectin normal.  Qualitative fecal fat normal.  Pancreatic elastase normal indicative of known pancreatic insufficiency.  -1/4/2023 EGD Han Meredith showed mild gastric erythema otherwise normal EGD placed on omeprazole twice a day with plan for right upper quadrant ultrasound to evaluate elevated alkaline phosphatase.  Pathology consistent with chronic gastritis with esophageal biopsy mild basal cell hyperplasia.  -1/13/2023 abdominal ultrasound patient canceled.  -2/15/2023 white count 15,600 with otherwise unremarkable CBC.  Alkaline phosphatase 158 glucose 105 otherwise unremarkable CBC.  -2/23/2023 white count 12,200 otherwise unremarkable CBC  -3/10/2023 white count 12,300 otherwise unremarkable CBC and differential.  Rheumatoid factor and ALICIA negative.  Sedimentation rate 6.    -5/23/2023 [facility] hematology consult: Patient has had chronic dyspeptic symptoms with evidence of laryngeal edema and some gastritis.  The EGD that was performed by Dr. Meredith earlier this year was performed after she had been on 9 months of omeprazole after her ENT exam March 2022 showed laryngeal edema and hence some of the dyspeptic symptoms may have been mitigated.  However, due to financial constraints she has not been able to do the abdominal ultrasound ordered by Dr. Meredith nor has she gotten refills on her omeprazole so she has been only taking it about every other day for the past month.  She will get with Leisa Brito for refill on her omeprazole or at the very minimum will use over-the-counter Nexium and I explained about the reality of rebound hyperacidity on cessation of proton pump inhibition.  If her white count today is actually higher than it was in March that would suggest this.  I will check her  peripheral smear but my suspicions for a chronic leukemia or malignant process especially given that she recounts having had a mild elevation of white counts periodically back into her early teens makes these more sinister culprits very unlikely particularly at her young age and with no lymphadenopathy on exam and no hepatosplenomegaly on exam.  I will check a blood count and peripheral smear with pathology to review and a blood count again in about 2 months on routine proton pump inhibition.  The fact that her sedimentation rate is normal does not rule out inflammation given that her white count elevation is minuscule and is likely just an indicator of low-level inflammation of which she has many potential culprits.  She has had a lot of stress both physically and fiscally which has made it hard for her to comply with all of her testing orders and with all of her medications due to financial constraints.  With regard to the periodic rare erythema on the dorsum of both hands, she has never seen dermatology but I asked her to get with Leisa Brito for dermatology appointment so that, if she ever has another event such as that, she can urgently be seen for biopsy during the flare to look for leukocytoclastic vasculitis but there is no serologic evidence nor current clinical evidence of lupus or other autoimmune disorder that I can detect.    Total time of care today inclusive of time spent today prior to her arrival reviewing past medical records and cataloging data as outlined above and during visit interviewing her as to signs or symptoms of potential culprits of reactive leukocytosis and the difference between that and clonal proliferation disorders of myelocytes and lymphocytes which are much less likely in her setting and the work-up thereof and after visit instituting this plan took 45 minutes of patient care time throughout the day today.    Herberth Han MD    5/23/2023

## 2023-05-23 NOTE — LETTER
May 23, 2023     Leisa Brito PA-C  [address] [facility] [address]    Patient: Fabby Carter   YOB: 1995   Date of Visit: 5/23/2023       Dear Leisa Brito PA-C    Fabby Carter was in my office today. Below is a copy of my note.    If you have questions, please do not hesitate to call me. I look forward to following Fabby along with you.         Sincerely,        Herberth Han MD        CC: No Recipients    CHIEF COMPLAINT: Leukocytosis    REASON FOR REFERRAL: Leukocytosis      RECORDS OBTAINED  Records of the patients history including those obtained from primary care and Dr. Meredith were reviewed and summarized in detail.    Oncology/Hematology History Overview Note   1.  Leukocytosis  2.  Arthritis with chronic low back pain  3.  Gastritis on EGD January 2023  4.  Hypertension  5.  Obstructive sleep apnea potentially with obesity with evidence of reflux and laryngeal edema on endoscopy with ENT   6.  Anxiety depression on Lamictal with erythema back of hands on higher doses of Lamictal    Hematology history timeline:  -3/3/2022 CBC unremarkable with white count 10,200 differential.  -3/25/2022 potential obstructive sleep apnea with obesity with evidence of reflux and laryngeal edema on endoscopy with ENT this day.  -6/30/2022 alkaline phosphatase 176  -12/15/2022 alkaline phosphatase 143 with normal percentages of intestinal, bone, and liver isoenzymes.  GGT normal.  Celiac panel negative.  Calprotectin normal.  Qualitative fecal fat normal.  Pancreatic elastase normal indicative of known pancreatic insufficiency.  -1/4/2023 EGD Han Meredith showed mild gastric erythema otherwise normal EGD placed on omeprazole twice a day with plan for right upper quadrant ultrasound to evaluate elevated alkaline phosphatase.  Pathology consistent with chronic gastritis with esophageal biopsy mild basal cell hyperplasia.  -1/13/2023 abdominal ultrasound patient canceled.  -2/15/2023  white count 15,600 with otherwise unremarkable CBC.  Alkaline phosphatase 158 glucose 105 otherwise unremarkable CBC.  -2/23/2023 white count 12,200 otherwise unremarkable CBC  -3/10/2023 white count 12,300 otherwise unremarkable CBC and differential.  Rheumatoid factor and ALICIA negative.  Sedimentation rate 6.    -5/23/2023 [facility]ology consult: Patient has had chronic dyspeptic symptoms with evidence of laryngeal edema and some gastritis.  The EGD that was performed by Dr. Meredith earlier this year was performed after she had been on 9 months of omeprazole after her ENT exam March 2022 showed laryngeal edema and hence some of the dyspeptic symptoms may have been mitigated.  However, due to financial constraints she has not been able to do the abdominal ultrasound ordered by Dr. Meredith nor has she gotten refills on her omeprazole so she has been only taking it about every other day for the past month.  She will get with Leisa Brito for refill on her omeprazole or at the very minimum will use over-the-counter Nexium and I explained about the reality of rebound hyperacidity on cessation of proton pump inhibition.  If her white count today is actually higher than it was in March that would suggest this.  I will check her peripheral smear but my suspicions for a chronic leukemia or malignant process especially given that she recounts having had a mild elevation of white counts periodically back into her early teens makes these more sinister culprits very unlikely particularly at her young age and with no lymphadenopathy on exam and no hepatosplenomegaly on exam.  I will check a blood count and peripheral smear with pathology to review and a blood count again in about 2 months on routine proton pump inhibition.  The fact that her sedimentation rate is normal does not rule out inflammation given that her white count elevation is minuscule and is likely just an indicator of low-level inflammation of which she has  many potential culprits.  She has had a lot of stress both physically and fiscally which has made it hard for her to comply with all of her testing orders and with all of her medications due to financial constraints.  With regard to the periodic rare erythema on the dorsum of both hands, she has never seen dermatology but I asked her to get with Leisa Brito for dermatology appointment so that, if she ever has another event such as that, she can urgently be seen for biopsy during the flare to look for leukocytoclastic vasculitis but there is no serologic evidence nor current clinical evidence of lupus or other autoimmune disorder that I can detect.     Leucocytosis       HISTORY OF PRESENT ILLNESS:  The patient is a 27 y.o.  female, referred for chronic leukocytosis that she says dates back even into her early teens and has waxed and waned over time.  Takes Lamictal for depression anxiety and on higher dose states that the dorsum of her hands turns red.  Has never seen dermatology during 1 of these episodes.  Denies fevers or chills.  She has had symptoms of reflux and signs of this on ENT and EGD examination.  More recently due to financial constraints and lack of refills she has been off the omeprazole and instead of taking it twice a day as prescribed by Dr. Meredith has only been taking it about once every other day for the last month.    REVIEW OF SYSTEMS:  Periodic chest pains with nocturnal reflux symptoms and erythema dorsum of hands periodically.  No ronak synovitis.  No fevers or chills or unexplained weight loss.    Past Medical History:   Diagnosis Date   • Anxiety    • Hypertension    • Neurotic depression    • Obesity      No past surgical history on file.    Current Outpatient Medications on File Prior to Visit   Medication Sig Dispense Refill   • amLODIPine (NORVASC) 10 MG tablet Take 1 tablet by mouth Daily. 90 tablet 1   • omeprazole (priLOSEC) 40 MG capsule Take 1 capsule by mouth Daily. 30  "capsule 0     No current facility-administered medications on file prior to visit.       No Known Allergies    Social History     Socioeconomic History   • Marital status:    Tobacco Use   • Smoking status: Never   • Smokeless tobacco: Never   Vaping Use   • Vaping Use: Never used   Substance and Sexual Activity   • Alcohol use: Not Currently   • Drug use: Never   • Sexual activity: Yes     Partners: Female       Family History   Problem Relation Age of Onset   • Depression Mother    • ADD / ADHD Mother    • Obesity Mother    • Allergies Mother    • Mental illness Mother    • Diabetes Father    • Allergies Father    • Obesity Father    • Mental illness Father    • Mental illness Other    • Obesity Other    • Alcohol abuse Other    • Cancer Other    • Diabetes Other        PHYSICAL EXAM:  No ronak synovitis or rashes.  No cervical axillary or inguinal lymphadenopathy.  No splenomegaly.  No audible wheezes.  No respiratory distress.    /85   Pulse 87   Temp 98.6 °F (37 °C)   Resp 18   Ht 152.4 cm (60\")   Wt 108 kg (237 lb)   SpO2 98%   BMI 46.29 kg/m²     ECOG score: 0           ECOG: (0) Fully Active - Able to Carry On All Pre-disease Performance Without Restriction    Lab Results   Component Value Date    HGB 14.2 03/10/2023    HCT 41.3 03/10/2023    MCV 87 03/10/2023     03/10/2023    WBC 12.3 (H) 03/10/2023    NEUTROABS 8.0 (H) 03/10/2023    LYMPHSABS 3.0 03/10/2023    MONOSABS 0.8 03/10/2023    EOSABS 0.4 03/10/2023    BASOSABS 0.1 03/10/2023     Lab Results   Component Value Date    GLUCOSE 105 (H) 02/15/2023    BUN 13 02/15/2023    CREATININE 0.72 02/15/2023     02/15/2023    K 4.6 02/15/2023     02/15/2023    CO2 23 02/15/2023    CALCIUM 9.7 02/15/2023    ALBUMIN 4.7 02/15/2023    BILITOT 0.4 02/15/2023    ALKPHOS 158 (H) 02/15/2023    AST 23 02/15/2023    ALT 29 02/15/2023         Assessment & Plan     1.  Leukocytosis  2.  Arthritis with chronic low back pain  3.  " Gastritis on EGD January 2023  4.  Hypertension  5.  Obstructive sleep apnea potentially with obesity with evidence of reflux and laryngeal edema on endoscopy with ENT   6.  Anxiety depression on Lamictal with erythema back of hands on higher doses of Lamictal    Hematology history timeline:  -3/3/2022 CBC unremarkable with white count 10,200 differential.  -3/25/2022 potential obstructive sleep apnea with obesity with evidence of reflux and laryngeal edema on endoscopy with ENT this day.  -6/30/2022 alkaline phosphatase 176  -12/15/2022 alkaline phosphatase 143 with normal percentages of intestinal, bone, and liver isoenzymes.  GGT normal.  Celiac panel negative.  Calprotectin normal.  Qualitative fecal fat normal.  Pancreatic elastase normal indicative of known pancreatic insufficiency.  -1/4/2023 EGD Han Meredith showed mild gastric erythema otherwise normal EGD placed on omeprazole twice a day with plan for right upper quadrant ultrasound to evaluate elevated alkaline phosphatase.  Pathology consistent with chronic gastritis with esophageal biopsy mild basal cell hyperplasia.  -1/13/2023 abdominal ultrasound patient canceled.  -2/15/2023 white count 15,600 with otherwise unremarkable CBC.  Alkaline phosphatase 158 glucose 105 otherwise unremarkable CBC.  -2/23/2023 white count 12,200 otherwise unremarkable CBC  -3/10/2023 white count 12,300 otherwise unremarkable CBC and differential.  Rheumatoid factor and ALICIA negative.  Sedimentation rate 6.    -5/23/2023 [facility] hematology consult: Patient has had chronic dyspeptic symptoms with evidence of laryngeal edema and some gastritis.  The EGD that was performed by Dr. Meredith earlier this year was performed after she had been on 9 months of omeprazole after her ENT exam March 2022 showed laryngeal edema and hence some of the dyspeptic symptoms may have been mitigated.  However, due to financial constraints she has not been able to do the abdominal ultrasound ordered  by Dr. Meredith nor has she gotten refills on her omeprazole so she has been only taking it about every other day for the past month.  She will get with Leisa Brito for refill on her omeprazole or at the very minimum will use over-the-counter Nexium and I explained about the reality of rebound hyperacidity on cessation of proton pump inhibition.  If her white count today is actually higher than it was in March that would suggest this.  I will check her peripheral smear but my suspicions for a chronic leukemia or malignant process especially given that she recounts having had a mild elevation of white counts periodically back into her early teens makes these more sinister culprits very unlikely particularly at her young age and with no lymphadenopathy on exam and no hepatosplenomegaly on exam.  I will check a blood count and peripheral smear with pathology to review and a blood count again in about 2 months on routine proton pump inhibition.  The fact that her sedimentation rate is normal does not rule out inflammation given that her white count elevation is minuscule and is likely just an indicator of low-level inflammation of which she has many potential culprits.  She has had a lot of stress both physically and fiscally which has made it hard for her to comply with all of her testing orders and with all of her medications due to financial constraints.  With regard to the periodic rare erythema on the dorsum of both hands, she has never seen dermatology but I asked her to get with Leisa Brito for dermatology appointment so that, if she ever has another event such as that, she can urgently be seen for biopsy during the flare to look for leukocytoclastic vasculitis but there is no serologic evidence nor current clinical evidence of lupus or other autoimmune disorder that I can detect.    Total time of care today inclusive of time spent today prior to her arrival reviewing past medical records and cataloging data  as outlined above and during visit interviewing her as to signs or symptoms of potential culprits of reactive leukocytosis and the difference between that and clonal proliferation disorders of myelocytes and lymphocytes which are much less likely in her setting and the work-up thereof and after visit instituting this plan took 45 minutes of patient care time throughout the day today.    Herberth Han MD    5/23/2023

## 2023-05-26 LAB
BASOPHILS # BLD AUTO: 0.1 X10E3/UL (ref 0–0.2)
BASOPHILS NFR BLD AUTO: 1 %
EOSINOPHIL # BLD AUTO: 0.8 X10E3/UL (ref 0–0.4)
EOSINOPHIL NFR BLD AUTO: 5 %
ERYTHROCYTE [DISTWIDTH] IN BLOOD BY AUTOMATED COUNT: 12.8 % (ref 11.7–15.4)
HCT VFR BLD AUTO: 42.7 % (ref 34–46.6)
HGB BLD-MCNC: 14.2 G/DL (ref 11.1–15.9)
IMM GRANULOCYTES # BLD AUTO: 0 X10E3/UL (ref 0–0.1)
IMM GRANULOCYTES NFR BLD AUTO: 0 %
LYMPHOCYTES # BLD AUTO: 3.5 X10E3/UL (ref 0.7–3.1)
LYMPHOCYTES NFR BLD AUTO: 24 %
MCH RBC QN AUTO: 29.6 PG (ref 26.6–33)
MCHC RBC AUTO-ENTMCNC: 33.3 G/DL (ref 31.5–35.7)
MCV RBC AUTO: 89 FL (ref 79–97)
MONOCYTES # BLD AUTO: 1.2 X10E3/UL (ref 0.1–0.9)
MONOCYTES NFR BLD AUTO: 8 %
NEUTROPHILS # BLD AUTO: 9.1 X10E3/UL (ref 1.4–7)
NEUTROPHILS NFR BLD AUTO: 62 %
PATH INTERP BLD-IMP: ABNORMAL
PATH REV BLD -IMP: ABNORMAL
PATHOLOGIST NAME: ABNORMAL
PLATELET # BLD AUTO: 373 X10E3/UL (ref 150–450)
RBC # BLD AUTO: 4.8 X10E6/UL (ref 3.77–5.28)
WBC # BLD AUTO: 14.7 X10E3/UL (ref 3.4–10.8)

## 2023-06-03 ENCOUNTER — PATIENT MESSAGE (OUTPATIENT)
Dept: FAMILY MEDICINE CLINIC | Facility: CLINIC | Age: 28
End: 2023-06-03
Payer: COMMERCIAL

## 2023-06-05 ENCOUNTER — TELEPHONE (OUTPATIENT)
Dept: FAMILY MEDICINE CLINIC | Facility: CLINIC | Age: 28
End: 2023-06-05
Payer: COMMERCIAL

## 2023-06-05 RX ORDER — TRIAMCINOLONE ACETONIDE 1 MG/G
CREAM TOPICAL
Qty: 15 G | Refills: 0 | Status: SHIPPED | OUTPATIENT
Start: 2023-06-05

## 2023-06-06 NOTE — TELEPHONE ENCOUNTER
Please let her know I sent in stronger steroid cream to use for rash on hand and arm-- apply small amount to affected area twice a day as needed - don't use with other steroid creams; if not improving after using for several days would recommend to get in with dermatology if needed; also recommend to avoid being out in sun/wear sunscreen as that too can cause dermatitis type issue- thanks

## 2023-06-14 NOTE — TELEPHONE ENCOUNTER
Sent x 1 until she can see sleep clinic   Detail Level: Detailed Size Of Lesion: 3mm skin toned papule Size Of Lesion: 6mm skin toned papule

## 2023-07-24 RX ORDER — AMLODIPINE BESYLATE 10 MG/1
10 TABLET ORAL DAILY
Qty: 90 TABLET | Refills: 0 | Status: SHIPPED | OUTPATIENT
Start: 2023-07-24

## 2025-03-28 ENCOUNTER — TELEPHONE (OUTPATIENT)
Dept: GENETICS | Facility: HOSPITAL | Age: 30
End: 2025-03-28
Payer: COMMERCIAL

## 2025-03-28 NOTE — TELEPHONE ENCOUNTER
Patient was called regarding the fax-in referral from Krystle Bedolla MD.   No answer and unable to leave a message.